# Patient Record
Sex: FEMALE | Race: WHITE | Employment: STUDENT | URBAN - METROPOLITAN AREA
[De-identification: names, ages, dates, MRNs, and addresses within clinical notes are randomized per-mention and may not be internally consistent; named-entity substitution may affect disease eponyms.]

---

## 2019-02-04 ENCOUNTER — HOSPITAL ENCOUNTER (EMERGENCY)
Facility: CLINIC | Age: 19
Discharge: HOME | End: 2019-02-04
Attending: EMERGENCY MEDICINE | Admitting: EMERGENCY MEDICINE
Payer: COMMERCIAL

## 2019-02-04 ENCOUNTER — APPOINTMENT (OUTPATIENT)
Dept: CT IMAGING | Facility: CLINIC | Age: 19
End: 2019-02-04
Payer: COMMERCIAL

## 2019-02-04 VITALS
WEIGHT: 111 LBS | DIASTOLIC BLOOD PRESSURE: 89 MMHG | SYSTOLIC BLOOD PRESSURE: 137 MMHG | BODY MASS INDEX: 20.96 KG/M2 | HEIGHT: 61 IN | OXYGEN SATURATION: 100 % | HEART RATE: 93 BPM | TEMPERATURE: 98.1 F | RESPIRATION RATE: 18 BRPM

## 2019-02-04 DIAGNOSIS — R11.2 NAUSEA AND VOMITING, INTRACTABILITY OF VOMITING NOT SPECIFIED, UNSPECIFIED VOMITING TYPE: ICD-10-CM

## 2019-02-04 DIAGNOSIS — R55 SYNCOPE, UNSPECIFIED SYNCOPE TYPE: ICD-10-CM

## 2019-02-04 DIAGNOSIS — R10.9 INTERMITTENT ABDOMINAL PAIN: ICD-10-CM

## 2019-02-04 LAB
ALBUMIN UR-MCNC: 30 MG/DL
ANION GAP SERPL CALCULATED.3IONS-SCNC: 10 MMOL/L (ref 3–14)
APPEARANCE UR: ABNORMAL
BACTERIA #/AREA URNS HPF: ABNORMAL /HPF
BASOPHILS # BLD AUTO: 0 10E9/L (ref 0–0.2)
BASOPHILS NFR BLD AUTO: 0.2 %
BILIRUB UR QL STRIP: NEGATIVE
BUN SERPL-MCNC: 10 MG/DL (ref 7–30)
CALCIUM SERPL-MCNC: 9 MG/DL (ref 8.5–10.1)
CHLORIDE SERPL-SCNC: 107 MMOL/L (ref 96–110)
CO2 SERPL-SCNC: 24 MMOL/L (ref 20–32)
COLOR UR AUTO: YELLOW
CREAT SERPL-MCNC: 0.73 MG/DL (ref 0.5–1)
DIFFERENTIAL METHOD BLD: ABNORMAL
EOSINOPHIL # BLD AUTO: 0 10E9/L (ref 0–0.7)
EOSINOPHIL NFR BLD AUTO: 0.3 %
ERYTHROCYTE [DISTWIDTH] IN BLOOD BY AUTOMATED COUNT: 13 % (ref 10–15)
GFR SERPL CREATININE-BSD FRML MDRD: >90 ML/MIN/{1.73_M2}
GLUCOSE SERPL-MCNC: 100 MG/DL (ref 70–99)
GLUCOSE UR STRIP-MCNC: NEGATIVE MG/DL
HCG UR QL: NEGATIVE
HCT VFR BLD AUTO: 36.9 % (ref 35–47)
HGB BLD-MCNC: 13.2 G/DL (ref 11.7–15.7)
HGB UR QL STRIP: ABNORMAL
IMM GRANULOCYTES # BLD: 0 10E9/L (ref 0–0.4)
IMM GRANULOCYTES NFR BLD: 0.2 %
INTERNAL QC OK POCT: YES
INTERPRETATION ECG - MUSE: NORMAL
KETONES UR STRIP-MCNC: NEGATIVE MG/DL
LEUKOCYTE ESTERASE UR QL STRIP: ABNORMAL
LYMPHOCYTES # BLD AUTO: 1.6 10E9/L (ref 0.8–5.3)
LYMPHOCYTES NFR BLD AUTO: 14 %
MCH RBC QN AUTO: 30.3 PG (ref 26.5–33)
MCHC RBC AUTO-ENTMCNC: 35.8 G/DL (ref 31.5–36.5)
MCV RBC AUTO: 85 FL (ref 78–100)
MONOCYTES # BLD AUTO: 0.7 10E9/L (ref 0–1.3)
MONOCYTES NFR BLD AUTO: 5.9 %
MUCOUS THREADS #/AREA URNS LPF: PRESENT /LPF
NEUTROPHILS # BLD AUTO: 9.1 10E9/L (ref 1.6–8.3)
NEUTROPHILS NFR BLD AUTO: 79.4 %
NITRATE UR QL: NEGATIVE
NRBC # BLD AUTO: 0 10*3/UL
NRBC BLD AUTO-RTO: 0 /100
PH UR STRIP: 5.5 PH (ref 5–7)
PLATELET # BLD AUTO: 270 10E9/L (ref 150–450)
POTASSIUM SERPL-SCNC: 3.4 MMOL/L (ref 3.4–5.3)
RBC # BLD AUTO: 4.36 10E12/L (ref 3.8–5.2)
RBC #/AREA URNS AUTO: 3 /HPF (ref 0–2)
SODIUM SERPL-SCNC: 141 MMOL/L (ref 133–144)
SOURCE: ABNORMAL
SP GR UR STRIP: 1.02 (ref 1–1.03)
SQUAMOUS #/AREA URNS AUTO: 18 /HPF (ref 0–1)
UROBILINOGEN UR STRIP-MCNC: NORMAL MG/DL (ref 0–2)
WBC # BLD AUTO: 11.4 10E9/L (ref 4–11)
WBC #/AREA URNS AUTO: 7 /HPF (ref 0–5)

## 2019-02-04 PROCEDURE — 93005 ELECTROCARDIOGRAM TRACING: CPT | Performed by: EMERGENCY MEDICINE

## 2019-02-04 PROCEDURE — 87088 URINE BACTERIA CULTURE: CPT | Performed by: EMERGENCY MEDICINE

## 2019-02-04 PROCEDURE — 87186 SC STD MICRODIL/AGAR DIL: CPT | Performed by: EMERGENCY MEDICINE

## 2019-02-04 PROCEDURE — 96361 HYDRATE IV INFUSION ADD-ON: CPT | Performed by: EMERGENCY MEDICINE

## 2019-02-04 PROCEDURE — 85025 COMPLETE CBC W/AUTO DIFF WBC: CPT | Performed by: EMERGENCY MEDICINE

## 2019-02-04 PROCEDURE — 80048 BASIC METABOLIC PNL TOTAL CA: CPT | Performed by: EMERGENCY MEDICINE

## 2019-02-04 PROCEDURE — 25000128 H RX IP 250 OP 636: Performed by: EMERGENCY MEDICINE

## 2019-02-04 PROCEDURE — 96374 THER/PROPH/DIAG INJ IV PUSH: CPT | Mod: 59 | Performed by: EMERGENCY MEDICINE

## 2019-02-04 PROCEDURE — 81025 URINE PREGNANCY TEST: CPT | Performed by: EMERGENCY MEDICINE

## 2019-02-04 PROCEDURE — 81001 URINALYSIS AUTO W/SCOPE: CPT | Performed by: EMERGENCY MEDICINE

## 2019-02-04 PROCEDURE — 87086 URINE CULTURE/COLONY COUNT: CPT | Performed by: EMERGENCY MEDICINE

## 2019-02-04 PROCEDURE — 99285 EMERGENCY DEPT VISIT HI MDM: CPT | Mod: 25 | Performed by: EMERGENCY MEDICINE

## 2019-02-04 PROCEDURE — 71260 CT THORAX DX C+: CPT

## 2019-02-04 PROCEDURE — 25000125 ZZHC RX 250: Performed by: EMERGENCY MEDICINE

## 2019-02-04 PROCEDURE — 93010 ELECTROCARDIOGRAM REPORT: CPT | Mod: Z6 | Performed by: EMERGENCY MEDICINE

## 2019-02-04 RX ORDER — ONDANSETRON 2 MG/ML
8 INJECTION INTRAMUSCULAR; INTRAVENOUS ONCE
Status: COMPLETED | OUTPATIENT
Start: 2019-02-04 | End: 2019-02-04

## 2019-02-04 RX ORDER — IOPAMIDOL 755 MG/ML
100 INJECTION, SOLUTION INTRAVASCULAR ONCE
Status: COMPLETED | OUTPATIENT
Start: 2019-02-04 | End: 2019-02-04

## 2019-02-04 RX ADMIN — SODIUM CHLORIDE 1000 ML: 9 INJECTION, SOLUTION INTRAVENOUS at 06:22

## 2019-02-04 RX ADMIN — IOPAMIDOL 46 ML: 755 INJECTION, SOLUTION INTRAVENOUS at 07:43

## 2019-02-04 RX ADMIN — SODIUM CHLORIDE 72 ML: 9 INJECTION, SOLUTION INTRAVENOUS at 07:43

## 2019-02-04 RX ADMIN — ONDANSETRON 8 MG: 2 INJECTION INTRAMUSCULAR; INTRAVENOUS at 06:22

## 2019-02-04 SDOH — HEALTH STABILITY: MENTAL HEALTH: HOW OFTEN DO YOU HAVE A DRINK CONTAINING ALCOHOL?: NEVER

## 2019-02-04 ASSESSMENT — ENCOUNTER SYMPTOMS
ABDOMINAL PAIN: 1
COUGH: 0
SHORTNESS OF BREATH: 1
FEVER: 0
NAUSEA: 1
VOMITING: 1

## 2019-02-04 ASSESSMENT — MIFFLIN-ST. JEOR: SCORE: 1215.87

## 2019-02-04 NOTE — ED PROVIDER NOTES
"     Emergency Department Patient Sign-out       Brief HPI:  This is a 18 year old female signed out to me by Dr. Montes .  See initial ED Provider note for details of the presentation.     Significant Events prior to my assuming care: Patient with vague abdominal pain that is now gone.  There is concern about PE due to risk factors and flight.  Pending CT PE scan.      Exam:   Patient Vitals for the past 24 hrs:   BP Temp Temp src Pulse Resp SpO2 Height Weight   02/04/19 0511 137/89 98.1  F (36.7  C) Oral 93 18 100 % 1.549 m (5' 1\") 50.3 kg (111 lb)           ED RESULTS:   Results for orders placed or performed during the hospital encounter of 02/04/19 (from the past 24 hour(s))   CBC with platelets differential     Status: Abnormal    Collection Time: 02/04/19  5:27 AM   Result Value Ref Range    WBC 11.4 (H) 4.0 - 11.0 10e9/L    RBC Count 4.36 3.8 - 5.2 10e12/L    Hemoglobin 13.2 11.7 - 15.7 g/dL    Hematocrit 36.9 35.0 - 47.0 %    MCV 85 78 - 100 fl    MCH 30.3 26.5 - 33.0 pg    MCHC 35.8 31.5 - 36.5 g/dL    RDW 13.0 10.0 - 15.0 %    Platelet Count 270 150 - 450 10e9/L    Diff Method Automated Method     % Neutrophils 79.4 %    % Lymphocytes 14.0 %    % Monocytes 5.9 %    % Eosinophils 0.3 %    % Basophils 0.2 %    % Immature Granulocytes 0.2 %    Nucleated RBCs 0 0 /100    Absolute Neutrophil 9.1 (H) 1.6 - 8.3 10e9/L    Absolute Lymphocytes 1.6 0.8 - 5.3 10e9/L    Absolute Monocytes 0.7 0.0 - 1.3 10e9/L    Absolute Eosinophils 0.0 0.0 - 0.7 10e9/L    Absolute Basophils 0.0 0.0 - 0.2 10e9/L    Abs Immature Granulocytes 0.0 0 - 0.4 10e9/L    Absolute Nucleated RBC 0.0    Basic metabolic panel     Status: Abnormal    Collection Time: 02/04/19  5:27 AM   Result Value Ref Range    Sodium 141 133 - 144 mmol/L    Potassium 3.4 3.4 - 5.3 mmol/L    Chloride 107 96 - 110 mmol/L    Carbon Dioxide 24 20 - 32 mmol/L    Anion Gap 10 3 - 14 mmol/L    Glucose 100 (H) 70 - 99 mg/dL    Urea Nitrogen 10 7 - 30 mg/dL    " Creatinine 0.73 0.50 - 1.00 mg/dL    GFR Estimate >90 >60 mL/min/[1.73_m2]    GFR Estimate If Black >90 >60 mL/min/[1.73_m2]    Calcium 9.0 8.5 - 10.1 mg/dL   UA with Microscopic     Status: Abnormal    Collection Time: 02/04/19  5:39 AM   Result Value Ref Range    Color Urine Yellow     Appearance Urine Slightly Cloudy     Glucose Urine Negative NEG^Negative mg/dL    Bilirubin Urine Negative NEG^Negative    Ketones Urine Negative NEG^Negative mg/dL    Specific Gravity Urine 1.024 1.003 - 1.035    Blood Urine Moderate (A) NEG^Negative    pH Urine 5.5 5.0 - 7.0 pH    Protein Albumin Urine 30 (A) NEG^Negative mg/dL    Urobilinogen mg/dL Normal 0.0 - 2.0 mg/dL    Nitrite Urine Negative NEG^Negative    Leukocyte Esterase Urine Moderate (A) NEG^Negative    Source Midstream Urine     WBC Urine 7 (H) 0 - 5 /HPF    RBC Urine 3 (H) 0 - 2 /HPF    Bacteria Urine Moderate (A) NEG^Negative /HPF    Squamous Epithelial /HPF Urine 18 (H) 0 - 1 /HPF    Mucous Urine Present (A) NEG^Negative /LPF   EKG 12 lead     Status: None (Preliminary result)    Collection Time: 02/04/19  5:47 AM   Result Value Ref Range    Interpretation ECG Click View Image link to view waveform and result    hCG qual urine POCT     Status: Normal    Collection Time: 02/04/19  5:54 AM   Result Value Ref Range    HCG Qual Urine Negative neg    Internal QC OK Yes    Chest CT, IV contrast only - PE protocol     Status: None    Collection Time: 02/04/19  7:45 AM    Narrative    CT CHEST PULMONARY EMBOLISM WITH CONTRAST   2/4/2019 7:45 AM     HISTORY: See the clinical information for interpreting provider.  Syncope, chest pain, dyspnea. Evaluate PE.    TECHNIQUE:  46 mL Isovue 370. Radiation dose for this scan was reduced  using automated exposure control, adjustment of the mA and/or kV  according to patient size, or iterative reconstruction technique.    COMPARISON: None.    FINDINGS:  No evidence of pulmonary embolism or acute thoracic aortic  abnormality. No  pneumothorax. No pleural or pericardial effusion. No  thoracic or axillary adenopathy. No pulmonary nodule or mass.  Visualized bones and upper abdomen are unremarkable.      Impression    IMPRESSION: Normal CT scan of the chest. No evidence of pulmonary  embolism.    MELISA ALONSO MD       ED MEDICATIONS:   Medications   0.9% sodium chloride BOLUS (1,000 mLs Intravenous New Bag 2/4/19 0622)   ondansetron (ZOFRAN) injection 8 mg (8 mg Intravenous Given 2/4/19 0622)   iopamidol (ISOVUE-370) solution 100 mL (46 mLs Intravenous Given 2/4/19 0743)   sodium chloride 0.9 % bag 500mL for CT scan flush use (72 mLs As instructed Given 2/4/19 0743)         Impression:    ICD-10-CM    1. Syncope, unspecified syncope type R55    2. Nausea and vomiting, intractability of vomiting not specified, unspecified vomiting type R11.2    3. Intermittent abdominal pain R10.9        Plan:    Repeat abdominal exam is negative.  Pulmonary embolism scan is negative.  Patient is feeling well.  Reviewed labs and workup, patient does feel well.  She does appear quite well and nontoxic.  Patient will be discharged and follow-up with OB.  She will return if symptoms are worsening.      Melisa Tracey MD  02/04/19 6366

## 2019-02-04 NOTE — LETTER
February 4, 2019      To Whom It May Concern:      Jhoanna Rubin was seen in our Emergency Department today, 02/04/19.  I expect her condition to improve over the next 1-2 days.  She may return to work/school when improved.    Sincerely,        Anish Clark MD

## 2019-02-04 NOTE — ED AVS SNAPSHOT
[facility], [place], Emergency Department  [address]  [facility] [address]  Phone:  [phone]  Fax:  [phone]                                    Johanna Rubin   MRN: [id number]    Department:  [facility], Emergency Department   Date of Visit:  2/4/2019           After Visit Summary Signature Page    I have received my discharge instructions, and my questions have been answered. I have discussed any challenges I see with this plan with the nurse or doctor.    ..........................................................................................................................................  Patient/Patient Representative Signature      ..........................................................................................................................................  Patient Representative Print Name and Relationship to Patient    ..................................................               ................................................  Date                                   Time    ..........................................................................................................................................  Reviewed by Signature/Title    ...................................................              ..............................................  Date                                               Time          22EPIC Rev 08/18

## 2019-02-04 NOTE — ED PROVIDER NOTES
History     Chief Complaint   Patient presents with     Abdominal Pain     patient experience abdominal pain after taking a birth control pill, today patient reported nausea and vomiting and passed out once. patient reported that she was prescribed this pills from [facility] for her dysmennorhea.     HPI  Johanna Rubin is a 18 year old female who Zentz with 2 episodes of syncope tonight.  She states she has had issues with dysmenorrhea, so when [place] several weeks ago she was prescribed birth control pills.They are called Optidril 30, and contain Ethinylestradiol 30 mcg/ Levonorgestrel 150 mcg.  She states the first day of her last menstrual period was about a week ago.  This is when she started the birth control pills.  She states that she felt fine prior to that, but immediately upon starting the birth control pill she started to feel fatigued, nauseated, and has had intermittent left sided pelvic pain.  She states she has not been sexually active since starting the birth control pills.  Her menses is now done.  She denies any abnormal vaginal discharge.  No fever, cough.  She states tonight she had some right-sided chest pain for about 5 minutes, felt short of breath, but admits she may have been anxious at the time.  She subsequently had 2 episodes of syncope.  The first while in the shower, second on the phone with her mom.  She states she returned from [facility] a couple of weeks ago.  She is a student here.  She denies any pain or swelling in her lower extremities.  No history of DVT or PE.  No reports that she has had some intermittent left lower quadrant/left pelvic pain, though none currently.  She is nauseated, and has had two episodes of vomiting. No diarrhea. She reports intermittent mild dysuria. She has some mild discomfort in the left upper quadrant currently.  There is been no recent fevers.  No palpitations.    History reviewed. No pertinent past medical history.    Past Surgical History:   Procedure  "Laterality Date     ORTHOPEDIC SURGERY         History reviewed. No pertinent family history.    Social History     Tobacco Use     Smoking status: Never Smoker     Smokeless tobacco: Never Used   Substance Use Topics     Alcohol use: No     Frequency: Never         I have reviewed the Medications, Allergies, Past Medical and Surgical History, and Social History in the Epic system.    Review of Systems   Constitutional: Negative for fever.   Respiratory: Positive for shortness of breath. Negative for cough.    Cardiovascular: Positive for chest pain.   Gastrointestinal: Positive for abdominal pain, nausea and vomiting.   Neurological: Positive for syncope.   All other systems reviewed and are negative.      Physical Exam   BP: 137/89  Pulse: 93  Temp: 98.1  F (36.7  C)  Resp: 18  Height: 154.9 cm (5' 1\")  Weight: 50.3 kg (111 lb)  SpO2: 100 %      Physical Exam   Constitutional: She is oriented to person, place, and time. No distress.   HENT:   Head: Atraumatic.   Mouth/Throat: Oropharynx is clear and moist. No oropharyngeal exudate.   Eyes: Pupils are equal, round, and reactive to light. No scleral icterus.   Cardiovascular: Normal heart sounds and intact distal pulses.   Pulmonary/Chest: Breath sounds normal. No respiratory distress.   Abdominal: Soft. There is no tenderness.   Musculoskeletal: She exhibits no edema or tenderness.   Neurological: She is alert and oriented to person, place, and time. No sensory deficit. She exhibits normal muscle tone.   Skin: Skin is warm. No rash noted. She is not diaphoretic.       ED Course        Procedures             EKG Interpretation:      Interpreted by Kaitlyn Montes  Time reviewed: 0550  Symptoms at time of EKG: nausea   Rhythm: normal sinus   Rate: normal  Axis: normal  Ectopy: none  Conduction: normal  ST Segments/ T Waves: No ST-T wave changes  Q Waves: none  Comparison to prior: No old EKG available    Clinical Impression: normal EKG          Critical Care " time:  none             Labs Ordered and Resulted from Time of ED Arrival Up to the Time of Departure from the ED   CBC WITH PLATELETS DIFFERENTIAL - Abnormal; Notable for the following components:       Result Value    WBC 11.4 (*)     Absolute Neutrophil 9.1 (*)     All other components within normal limits   BASIC METABOLIC PANEL - Abnormal; Notable for the following components:    Glucose 100 (*)     All other components within normal limits   ROUTINE UA WITH MICROSCOPIC - Abnormal; Notable for the following components:    Blood Urine Moderate (*)     Protein Albumin Urine 30 (*)     Leukocyte Esterase Urine Moderate (*)     WBC Urine 7 (*)     RBC Urine 3 (*)     Bacteria Urine Moderate (*)     Squamous Epithelial /HPF Urine 18 (*)     Mucous Urine Present (*)     All other components within normal limits   HCG QUAL URINE POCT - Normal   URINE CULTURE AEROBIC BACTERIAL            Assessments & Plan (with Medical Decision Making)   The patient presents with vague symptoms of intermittent abdominal pain as well as nausea, vomiting, and 2 episodes of syncope tonight.  She did recently start birth control pills, recently had a long flight from [place].  EKG was done and was unremarkable.  She is not pregnant.  I suspect she has not been eating or drinking real well given the ongoing, she may be a little dehydrated.  She was given some fluids here.  I do not have high suspicion for malignant arrhythmia.  No clear evidence to suggest serious infection.  She has no lower extremity pain or swelling.  However, given the risk factors, I do certainly think that she is at risk for PE.  She did have an episode of chest pain tonight with some shortness of breath, though this is all resolved.  I do think she is high enough risk that d-dimer is probably not appropriate.  She is agreeable to CT scan of her chest to evaluate for PE.  This is pending at this time.    Her abdominal exam is quite benign at this time.  Upon arrival,  she had no lower abdominal pain or tenderness at all, had mild left upper quadrant tenderness.  She now does have some mild low abdominal tenderness, though is quite minimal.  I did recommend she stop her oral contraceptive pill at this time.  I did discuss whether or not to pursue workup of her abdominal pain with either CT or an ultrasound, though given the fact that her symptoms are relatively mild, coming and going, her and I agreed that it is reasonable to try discontinuing her pills and see how she feels.  However, she is strongly encouraged to return if she develops fever, or any worsening or persistent abdominal pain.  I also will give her the phone number to follow-up with gynecology for further discussion of her dysmenorrhea.    She will be signed out to the oncoming provider at this time pending the chest CT.    Dictation Disclaimer: Some of this Note has been completed with voice-recognition dictation software. Although errors are generally corrected real-time, there is the potential for a rare error to be present in the completed chart.      I have reviewed the nursing notes.    I have reviewed the findings, diagnosis, plan and need for follow up with the patient.       Medication List      There are no discharge medications for this visit.         Final diagnoses:   Syncope, unspecified syncope type   Nausea and vomiting, intractability of vomiting not specified, unspecified vomiting type   Intermittent abdominal pain       2/4/2019   [facility], [place], EMERGENCY DEPARTMENT     Kaitlyn Montes MD  02/04/19 9105

## 2019-02-04 NOTE — ED TRIAGE NOTES
Patient presented to ED due to generalized abdominal pain, patient reported that pain started after taking birth control pills from [facility] for her dysmenorrhea. Patient was advised to see a doctor if the patient will experience any abdominal pain, nausea and vomiting. Today patient reported severe abdominal pain, some nausea and passed out once prompting the patient to come to ED.

## 2019-02-04 NOTE — DISCHARGE INSTRUCTIONS
Please make an appointment to follow up with [facility] (phone: [phone]) within a week. Return to the ER with any new or worsening symptoms. Stop your birth control pills. Drink plenty of water.

## 2019-02-05 ENCOUNTER — TELEPHONE (OUTPATIENT)
Dept: EMERGENCY MEDICINE | Facility: CLINIC | Age: 19
End: 2019-02-05

## 2019-02-05 DIAGNOSIS — N39.0 URINARY TRACT INFECTION: ICD-10-CM

## 2019-02-05 LAB
BACTERIA SPEC CULT: ABNORMAL
BACTERIA SPEC CULT: ABNORMAL
SPECIMEN SOURCE: ABNORMAL

## 2019-02-05 NOTE — TELEPHONE ENCOUNTER
[facility] Emergency Department Lab result notification [Adult-Female]    [facility] ED lab result protocol used  Urine culture    Reason for call  Notify of lab results, assess symptoms,  review ED providers recommendations/discharge instructions (if necessary) and advise per ED lab result f/u protocol    Lab Result (including Rx patient on, if applicable)  Preliminary urine culture report on 2/5/19 shows the presence of bacteria(s):  10,000 to 50,000 colonies/ml Escherichia coli  Emergency Dept/Urgent Care discharge antibiotic: None  Recommendations per [facility] ED Lab result protocol - Urine culture protocol.  Information table from ED Provider visit on 2/4/19  Symptoms reported at ED visit (Chief complaint, HPI) Johanna Rubin is a 18 year old female who Zentz with 2 episodes of syncope tonight.  She states she has had issues with dysmenorrhea, so when [place] several weeks ago she was prescribed birth control pills.They are called Optidril 30, and contain Ethinylestradiol 30 mcg/ Levonorgestrel 150 mcg.  She states the first day of her last menstrual period was about a week ago.  This is when she started the birth control pills.  She states that she felt fine prior to that, but immediately upon starting the birth control pill she started to feel fatigued, nauseated, and has had intermittent left sided pelvic pain.  She states she has not been sexually active since starting the birth control pills.  Her menses is now done.  She denies any abnormal vaginal discharge.  No fever, cough.  She states tonight she had some right-sided chest pain for about 5 minutes, felt short of breath, but admits she may have been anxious at the time.  She subsequently had 2 episodes of syncope.  The first while in the shower, second on the phone with her mom.  She states she returned from [facility] a couple of weeks ago.  She is a student here.  She denies any pain or swelling in her lower extremities.  No history of DVT or  PE.  No reports that she has had some intermittent left lower quadrant/left pelvic pain, though none currently.  She is nauseated, and has had two episodes of vomiting. No diarrhea. She reports intermittent mild dysuria. She has some mild discomfort in the left upper quadrant currently.  There is been no recent fevers.  No palpitations.   Significant Medical hx, if applicable (i.e. CKD, diabetes) NA   Allergies No Known Allergies   Weight, if applicable Wt Readings from Last 2 Encounters:   02/04/19 50.3 kg (111 lb) (18 %)*     * Growth percentiles are based on CDC (Girls, 2-20 Years) data.      Coumadin/Warfarin [Yes /No] No   Creatinine Level (mg/dl) Creatinine   Date Value Ref Range Status   02/04/2019 0.73 0.50 - 1.00 mg/dL Final      Creatinine clearance (ml/min), if applicable Serum creatinine: 0.73 mg/dL 02/04/19 0527  Estimated creatinine clearance: 98.4 mL/min   Pregnant (Yes/No/NA) No   Breastfeeding (Yes/No/NA) No   ED providers Impression and Plan (applicable information) The patient presents with vague symptoms of intermittent abdominal pain as well as nausea, vomiting, and 2 episodes of syncope tonight.  She did recently start birth control pills, recently had a long flight from [place].  EKG was done and was unremarkable.  She is not pregnant.  I suspect she has not been eating or drinking real well given the ongoing, she may be a little dehydrated.  She was given some fluids here.  I do not have high suspicion for malignant arrhythmia.  No clear evidence to suggest serious infection.  She has no lower extremity pain or swelling.  However, given the risk factors, I do certainly think that she is at risk for PE.  She did have an episode of chest pain tonight with some shortness of breath, though this is all resolved.  I do think she is high enough risk that d-dimer is probably not appropriate.  She is agreeable to CT scan of her chest to evaluate for PE.  This is pending at this time.     Her abdominal  exam is quite benign at this time.  Upon arrival, she had no lower abdominal pain or tenderness at all, had mild left upper quadrant tenderness.  She now does have some mild low abdominal tenderness, though is quite minimal.  I did recommend she stop her oral contraceptive pill at this time.  I did discuss whether or not to pursue workup of her abdominal pain with either CT or an ultrasound, though given the fact that her symptoms are relatively mild, coming and going, her and I agreed that it is reasonable to try discontinuing her pills and see how she feels.  However, she is strongly encouraged to return if she develops fever, or any worsening or persistent abdominal pain.  I also will give her the phone number to follow-up with gynecology for further discussion of her dysmenorrhea.     She will be signed out to the oncoming provider at this time pending the chest CT.   ED diagnosis Syncope, unspecified syncope type   Nausea and vomiting, intractability of vomiting not specified, unspecified vomiting type   Intermittent abdominal pain        ED provider Kaitlyn Montes MD      RN Assessment (Patient s current Symptoms), include time called.  [Insert Left message here if message left]  12:52PM: Left voicemail message requesting a call back to [phone] between 10 a.m. and 6:30 p.m., 7 days a week for patient's ED/UC lab results.  May leave a message 24/7, if no one available.     [RN Name]  Eladia Jaramillo RN  [facility] RN  Lung Nodule and ED Lab Result RN  Epic pool (ED late result f/u RN): [id number]  [facility] INCIDENTAL RADIOLOGY F/U NURSES: P [id number]  # [phone]    Copy of Lab result   Urine Culture Aerobic Bacterial [[id number]] (Order [id number])   Preliminary Result   Exam Information     Exam Date Exam Time Accession # Results    2/4/19  5:39 AM [id number]    Specimen Information: Midstream Urine        Component Collected Lab   Specimen Description 02/04/2019  5:39    Midstream  Urine    Culture Micro (Abnormal) 02/04/2019  5:39    Abnormal   10,000 to 50,000 colonies/mL   Escherichia coli   Susceptibility testing in progress     Culture Micro 02/04/2019  5:39    <10,000 colonies/mL   mixed urogenital rome   Susceptibility testing not routinely done

## 2019-02-05 NOTE — LETTER
February 7, 2019        Johanna Rubin  [address]   [facility] [address]          Dear Johanna Rubin:    You were seen in the [facility] at [facility], [place], EMERGENCY DEPARTMENT on 2/4/2019.  We are unable to reach you by phone, so we are sending you this letter.     It is important that you call [facility] Emergency Department Lab Result RN at [phone] as we have to make some changes in your treatment.     Best time to call back is between 10 a.m. and 6 p.m.      Sincerely,     [facility] Emergency Department Lab Result RN  [phone]

## 2019-02-06 RX ORDER — NITROFURANTOIN 25; 75 MG/1; MG/1
100 CAPSULE ORAL 2 TIMES DAILY
Qty: 10 CAPSULE | Refills: 0 | Status: CANCELLED | OUTPATIENT
Start: 2019-02-06 | End: 2019-02-11

## 2019-02-06 NOTE — TELEPHONE ENCOUNTER
[facility]  Emergency Department/Urgent Care Lab result notification:    Reason for call  Notify of lab results, assess symptoms,  review ED providers recommendations (if necessary) and advise per ED lab result f/u protocol.    Lab result  Final urine culture on 2/6/19 shows the presence of bacteria(s): 10,000 to 50,000 colonies/ml Escherichia coli  [facility] Emergency Dept/Urgent Care discharge antibiotic: None  As per  ED lab result protocol, treat per Urine culture protocol.  Left voicemail message requesting a call back to [phone] between 10 a.m. and 6:30 p.m. for patient's ED/UC lab results.    PCP follow-up Questions asked: NO    Agatha Denney RN    [facility] RN  Lung Nodule and ED Lab Results F/U RN  Epic pool (ED late result f/u RN) : [id number]   # [phone]

## 2019-02-07 NOTE — TELEPHONE ENCOUNTER
[facility]  Emergency Department/Urgent Care Lab result notification:    Reason for Letter being mailed out:      Lab result (Urine culture) is positive and Patient is untreated.    Unable to reach via telephone so letter sent with a message requesting a call back to [phone] between 10 a.m. and 6:30 p.m., 7 days a week for patient's ED/UC lab results.   Lab result:  Final urine culture on 2/6/19 shows the presence of bacteria(s): 10,000 to 50,000 colonies/ml Escherichia coli  [facility] Emergency Dept/Urgent Care discharge antibiotic: None  As per  ED lab result protocol, treat per Urine culture protocol.    Miscellaneous information: ABRAHAM Belle RN  [facility] RN  Lung Nodule and ED Lab Result RN  Epic pool (ED late result f/u RN): [id number]   INCIDENTAL RADIOLOGY F/U NURSES: P [id number]  # [phone]

## 2019-02-11 NOTE — TELEPHONE ENCOUNTER
[facility] Emergency Department Lab result notification     Patient/parent Name  Johanna    RN Assessment (Patient s current Symptoms), include time called.  [Insert Left message here if message left]  10:40AM: Patient called ED lab nurse after receiving a letter in the mail. Was seen in ED on 2/4/19 with intermittent abdominal pain and intermittent dysuria. Patient states that she is feeling well. Denies any abdominal pain, nausea, fever, back pain, urinary frequency, urgency, pain with urination. States that she is feeling well and does not have any symptoms.   Lab result (if applicable):  Final urine culture on 2/6/19 shows the presence of bacteria(s): 10,000 to 50,000 colonies/ml Escherichia coli  [facility] discharge antibiotic: None  As per  ED lab result protocol, treat per Urine culture protocol    RN Recommendations/Instructions per [facility] ED lab result protocol  Patient notified of lab result. Will consult with the [facility] ED provider for treatment plan.     [facility] Provider Name & Recommendations (included time consulted)  10:55AM: Consulted with [facility] ED provider Zara Valente CNP. She advised that no treatment be started at this time as the Patient is asymptomatic. Advised that the Patient see her PCP if symptoms return to have her urine rechecked.      [RN Name]  Eladia Jaramillo RN  [facility] RN  Lung Nodule and ED Lab Result RN  Epic pool (ED late result f/u RN): [id number]  [facility] INCIDENTAL RADIOLOGY F/U NURSES: [id number]  # [phone]    Copy of Lab result   Urine Culture Aerobic Bacterial [[id number]] (Order [id number])   Exam Information     Exam Date Exam Time Accession # Results    2/4/19  5:39 AM [id number]    Component Results     Specimen Information: Midstream Urine        Component Collected Lab   Specimen Description 02/04/2019  5:39    Midstream Urine    Culture Micro (Abnormal) 02/04/2019  5:39    Abnormal    10,000 to 50,000 colonies/mL   Escherichia coli     Culture Micro 02/04/2019  5:39    <10,000 colonies/mL   mixed urogenital rome   Susceptibility testing not routinely done    Susceptibility     Escherichia coli (1)     Antibiotic Interpretation Sensitivity Method Status   AMPICILLIN Sensitive <=2 ug/mL MENA Final   CEFAZOLIN Sensitive <=4 ug/mL MENA Final    Cefazolin MENA breakpoints are for the treatment of uncomplicated urinary tract   infections.  For the treatment of systemic infections, please contact the   laboratory for additional testing.   CEFOXITIN Sensitive <=4 ug/mL MENA Final   CEFTAZIDIME Sensitive <=1 ug/mL MENA Final   CEFTRIAXONE Sensitive <=1 ug/mL MENA Final   CIPROFLOXACIN Sensitive <=0.25 ug/mL MENA Final   GENTAMICIN Sensitive <=1 ug/mL MENA Final   LEVOFLOXACIN Sensitive <=0.12 ug/mL MENA Final   NITROFURANTOIN Sensitive <=16 ug/mL MENA Final   TOBRAMYCIN Sensitive <=1 ug/mL MENA Final   Trimethoprim/Sulfa Sensitive <=1/19 ug/mL MENA Final   AMPICILLIN/SULBACTAM Sensitive <=2 ug/mL MENA Final   Piperacillin/Tazo Sensitive <=4 ug/mL MENA Final   CEFEPIME Sensitive <=1 ug/mL MENA Final

## 2019-02-11 NOTE — TELEPHONE ENCOUNTER
12:08PM: Called Patient back. Advised per ED provider recommendation as noted below.   Patient is comfortable with monitoring for now.     Please Contact your PCP clinic or return to the Emergency department if your:    Symptoms return..    PCP follow-up Questions asked: YES       Eladia Jaramillo RN  [facility] [employer] [facility] RN  Lung Nodule and ED Lab Result RN  Epic pool (ED late result f/u RN): [id number]  [facility] INCIDENTAL RADIOLOGY F/U NURSES: P [id number]  # [phone]

## 2019-03-04 ENCOUNTER — HOSPITAL ENCOUNTER (EMERGENCY)
Facility: CLINIC | Age: 19
Discharge: HOME | End: 2019-03-04
Attending: FAMILY MEDICINE | Admitting: FAMILY MEDICINE
Payer: COMMERCIAL

## 2019-03-04 VITALS
BODY MASS INDEX: 20.88 KG/M2 | HEART RATE: 84 BPM | SYSTOLIC BLOOD PRESSURE: 107 MMHG | WEIGHT: 110.5 LBS | DIASTOLIC BLOOD PRESSURE: 73 MMHG | OXYGEN SATURATION: 99 % | RESPIRATION RATE: 16 BRPM | TEMPERATURE: 96.2 F

## 2019-03-04 DIAGNOSIS — N39.0 URINARY TRACT INFECTION WITHOUT HEMATURIA, SITE UNSPECIFIED: ICD-10-CM

## 2019-03-04 LAB
ALBUMIN UR-MCNC: NEGATIVE MG/DL
APPEARANCE UR: ABNORMAL
BACTERIA #/AREA URNS HPF: ABNORMAL /HPF
BILIRUB UR QL STRIP: NEGATIVE
COLOR UR AUTO: ABNORMAL
GLUCOSE UR STRIP-MCNC: NEGATIVE MG/DL
HCG UR QL: NEGATIVE
HGB UR QL STRIP: NEGATIVE
KETONES UR STRIP-MCNC: NEGATIVE MG/DL
LEUKOCYTE ESTERASE UR QL STRIP: ABNORMAL
NITRATE UR QL: NEGATIVE
PH UR STRIP: 6 PH (ref 5–7)
RBC #/AREA URNS AUTO: 1 /HPF (ref 0–2)
SOURCE: ABNORMAL
SP GR UR STRIP: 1 (ref 1–1.03)
SQUAMOUS #/AREA URNS AUTO: 3 /HPF (ref 0–1)
UROBILINOGEN UR STRIP-MCNC: NORMAL MG/DL (ref 0–2)
WBC #/AREA URNS AUTO: 4 /HPF (ref 0–5)

## 2019-03-04 PROCEDURE — 81001 URINALYSIS AUTO W/SCOPE: CPT | Performed by: FAMILY MEDICINE

## 2019-03-04 PROCEDURE — 99283 EMERGENCY DEPT VISIT LOW MDM: CPT | Performed by: FAMILY MEDICINE

## 2019-03-04 PROCEDURE — 81025 URINE PREGNANCY TEST: CPT | Performed by: FAMILY MEDICINE

## 2019-03-04 PROCEDURE — 99283 EMERGENCY DEPT VISIT LOW MDM: CPT | Mod: Z6 | Performed by: FAMILY MEDICINE

## 2019-03-04 RX ORDER — PHENAZOPYRIDINE HYDROCHLORIDE 200 MG/1
200 TABLET, FILM COATED ORAL 3 TIMES DAILY
Qty: 9 TABLET | Refills: 0 | Status: SHIPPED | OUTPATIENT
Start: 2019-03-04 | End: 2019-03-07

## 2019-03-04 RX ORDER — NITROFURANTOIN 25; 75 MG/1; MG/1
100 CAPSULE ORAL 2 TIMES DAILY
Qty: 6 CAPSULE | Refills: 0 | Status: SHIPPED | OUTPATIENT
Start: 2019-03-04

## 2019-03-04 NOTE — DISCHARGE INSTRUCTIONS
Thank you for choosing [facility].     Please closely monitor for further symptoms. Return to the Emergency Department if you develop any new or worsening signs or symptoms.    If you received any opiate pain medications or sedatives during your visit, please do not drive for at least 8 hours.     Labs, cultures or final xray interpretations may still need to be reviewed.  We will call you if your plan of care needs to be changed.    Please make an appointment to follow up with [facility] (phone: [phone]) to establish clinic.

## 2019-03-04 NOTE — ED AVS SNAPSHOT
[facility], [place], Emergency Department  [address]  [place] [address]  Phone:  [phone]  Fax:  [phone]                                    Johanna Rubin   MRN: [id number]    Department:  [facility], Emergency Department   Date of Visit:  3/4/2019           After Visit Summary Signature Page    I have received my discharge instructions, and my questions have been answered. I have discussed any challenges I see with this plan with the nurse or doctor.    ..........................................................................................................................................  Patient/Patient Representative Signature      ..........................................................................................................................................  Patient Representative Print Name and Relationship to Patient    ..................................................               ................................................  Date                                   Time    ..........................................................................................................................................  Reviewed by Signature/Title    ...................................................              ..............................................  Date                                               Time          22EPIC Rev 08/18

## 2019-03-04 NOTE — ED PROVIDER NOTES
History     Chief Complaint   Patient presents with     UTI     burning and frequency x 2 days     HPI  Johanna Rubin is a 18 year old otherwise healthy female who presents with a friend to the ED with complaints of frequency, urgency, and dysuria onset 2 days ago. She denies concern for pregnancy. She denies hematuria, fever, chills, nausea, voiting, or vaginal discharge.   Was seen in early February with similar symptoms and had a urine culture which grew 50,000 colonies of E. coli  I have reviewed the Medications, Allergies, Past Medical and Surgical History, and Social History in the [facility] system.  History reviewed. No pertinent past medical history.    Past Surgical History:   Procedure Laterality Date     ORTHOPEDIC SURGERY         History reviewed. No pertinent family history.    Social History     Tobacco Use     Smoking status: Never Smoker     Smokeless tobacco: Never Used   Substance Use Topics     Alcohol use: No     Frequency: Never       No current facility-administered medications for this encounter.      Current Outpatient Medications   Medication     nitroFURantoin macrocrystal-monohydrate (MACROBID) 100 MG capsule     phenazopyridine (PYRIDIUM) 200 MG tablet      No Known Allergies    Review of Systems  ROS: 14 point ROS neg other than the symptoms noted above in the HPI.    Physical Exam   BP: 122/69  Pulse: 82  Temp: 96.2  F (35.7  C)  Resp: 16  Weight: 50.1 kg (110 lb 8 oz)  SpO2: 99 %      Physical Exam   Constitutional: No distress.   HENT:   Head: Atraumatic.   Mouth/Throat: Oropharynx is clear and moist. No oropharyngeal exudate.   Eyes: Pupils are equal, round, and reactive to light. No scleral icterus.   Cardiovascular: Normal heart sounds and intact distal pulses.   Pulmonary/Chest: Breath sounds normal. No respiratory distress.   Abdominal: Soft. Bowel sounds are normal. There is no tenderness.   Musculoskeletal: She exhibits no edema or tenderness.   Skin: Skin is warm. No rash noted.  She is not diaphoretic.       ED Course        Procedures             Critical Care time:  none             Labs Ordered and Resulted from Time of ED Arrival Up to the Time of Departure from the ED   ROUTINE UA WITH MICROSCOPIC REFLEX TO CULTURE - Abnormal; Notable for the following components:       Result Value    Specific Gravity Urine 1.002 (*)     Leukocyte Esterase Urine Trace (*)     Bacteria Urine Few (*)     Squamous Epithelial /HPF Urine 3 (*)     All other components within normal limits   HCG QUALITATIVE URINE            Assessments & Plan (with Medical Decision Making)   Otherwise healthy 18-year-old female presenting with dysuria, urgency, frequency.  Differential diagnosis lower urinary tract infection, pyelonephritis, urethritis, vaginitis, cervicitis, ureterolithiasis.  On exam vital signs are normal and the patient is in no distress.  She has no significant flank or suprapubic tenderness.  The remainder the physical exam is normal.  Urine pregnancy test negative.  Her urinalysis is somewhat equivocal.  I reviewed her previous visit and she had similar symptoms but grew out E. coli on the culture.  Based on her clinical symptoms I will treat for uncomplicated lower urinary tract infection.  We discussed the indications for emergency department return and follow-up.  Stable for discharge.      I have reviewed the nursing notes.    I have reviewed the findings, diagnosis, plan and need for follow up with the patient.       Medication List      Started    nitroFURantoin macrocrystal-monohydrate 100 MG capsule  Commonly known as:  MACROBID  100 mg, Oral, 2 TIMES DAILY     phenazopyridine 200 MG tablet  Commonly known as:  PYRIDIUM  200 mg, Oral, 3 TIMES DAILY            Final diagnoses:   Urinary tract infection without hematuria, site unspecified       3/4/2019   [facility], [place], EMERGENCY DEPARTMENT     Jin Pelletier MD  03/04/19 1300